# Patient Record
Sex: MALE | Race: BLACK OR AFRICAN AMERICAN | NOT HISPANIC OR LATINO | Employment: FULL TIME | URBAN - METROPOLITAN AREA
[De-identification: names, ages, dates, MRNs, and addresses within clinical notes are randomized per-mention and may not be internally consistent; named-entity substitution may affect disease eponyms.]

---

## 2024-04-02 ENCOUNTER — OFFICE VISIT (OUTPATIENT)
Dept: FAMILY MEDICINE | Facility: CLINIC | Age: 39
End: 2024-04-02
Payer: COMMERCIAL

## 2024-04-02 VITALS
DIASTOLIC BLOOD PRESSURE: 86 MMHG | BODY MASS INDEX: 35.77 KG/M2 | HEIGHT: 67 IN | HEART RATE: 98 BPM | SYSTOLIC BLOOD PRESSURE: 126 MMHG | WEIGHT: 227.9 LBS | OXYGEN SATURATION: 98 % | TEMPERATURE: 97.9 F

## 2024-04-02 DIAGNOSIS — E66.812 CLASS 2 OBESITY DUE TO EXCESS CALORIES WITHOUT SERIOUS COMORBIDITY WITH BODY MASS INDEX (BMI) OF 35.0 TO 35.9 IN ADULT: ICD-10-CM

## 2024-04-02 DIAGNOSIS — Z11.4 SCREENING FOR HIV (HUMAN IMMUNODEFICIENCY VIRUS): ICD-10-CM

## 2024-04-02 DIAGNOSIS — Z13.1 SCREENING FOR DIABETES MELLITUS: ICD-10-CM

## 2024-04-02 DIAGNOSIS — Z82.3 FAMILY HISTORY OF STROKE (CEREBROVASCULAR): ICD-10-CM

## 2024-04-02 DIAGNOSIS — Z11.59 NEED FOR HEPATITIS C SCREENING TEST: ICD-10-CM

## 2024-04-02 DIAGNOSIS — Z13.220 LIPID SCREENING: ICD-10-CM

## 2024-04-02 DIAGNOSIS — Z00.00 ROUTINE GENERAL MEDICAL EXAMINATION AT A HEALTH CARE FACILITY: Primary | ICD-10-CM

## 2024-04-02 DIAGNOSIS — E66.09 CLASS 2 OBESITY DUE TO EXCESS CALORIES WITHOUT SERIOUS COMORBIDITY WITH BODY MASS INDEX (BMI) OF 35.0 TO 35.9 IN ADULT: ICD-10-CM

## 2024-04-02 PROCEDURE — 99385 PREV VISIT NEW AGE 18-39: CPT | Performed by: FAMILY MEDICINE

## 2024-04-02 SDOH — HEALTH STABILITY: PHYSICAL HEALTH: ON AVERAGE, HOW MANY DAYS PER WEEK DO YOU ENGAGE IN MODERATE TO STRENUOUS EXERCISE (LIKE A BRISK WALK)?: 1 DAY

## 2024-04-02 SDOH — HEALTH STABILITY: PHYSICAL HEALTH: ON AVERAGE, HOW MANY MINUTES DO YOU ENGAGE IN EXERCISE AT THIS LEVEL?: 30 MIN

## 2024-04-02 ASSESSMENT — SOCIAL DETERMINANTS OF HEALTH (SDOH): HOW OFTEN DO YOU GET TOGETHER WITH FRIENDS OR RELATIVES?: ONCE A WEEK

## 2024-04-02 NOTE — PROGRESS NOTES
"Preventive Care Visit  [facility] ANAIS Guerra MD, Family Medicine  Apr 2, 2024      Assessment & Plan     (Z00.00) Routine general medical examination at a health care facility  (primary encounter diagnosis)  Comment: We discussed exercise 30mins/day, and calcium with vitamin D at 1200mg/day, preferably from dietary sources.  Diet, Weight loss, and Exercise were discussed as well. Reviewed risks for heart attack and stroke at his request and talked about which were in his control and how to begin to change habits.      Talked at length about vaccinations. He says he has some documentation at home. Will send that in. He declined to get any today     Plan: Vitamin D Deficiency            (E66.09,  Z68.35) Class 2 obesity due to excess calories without serious comorbidity with body mass index (BMI) of 35.0 to 35.9 in adult  Comment: He is feeling motivated to get more active and eat better   Plan:     (Z11.4) Screening for HIV (human immunodeficiency virus)  Comment: discussed   Plan: HIV Antigen Antibody Combo            (Z11.59) Need for hepatitis C screening test  Comment: discussed   Plan: Hepatitis C Screen Reflex to HCV RNA Quant and         Genotype            (Z82.3) Family history of stroke (cerebrovascular)  Comment: his father   Plan:     (Z13.220) Lipid screening  Comment: discussed   Plan: Lipid panel reflex to direct LDL Fasting            (Z13.1) Screening for diabetes mellitus  Comment: discussed   Plan: Glucose              BMI  Estimated body mass index is 35.56 kg/m  as calculated from the following:    Height as of this encounter: 1.705 m (5' 7.13\").    Weight as of this encounter: 103.4 kg (227 lb 14.4 oz).       Counseling  Appropriate preventive services were discussed with this patient, including applicable screening as appropriate for fall prevention, nutrition, physical activity, Tobacco-use cessation, weight loss and cognition.  Checklist reviewing preventive " services available has been given to the patient.  Reviewed patient's diet, addressing concerns and/or questions.   He is at risk for lack of exercise and has been provided with information to increase physical activity for the benefit of his well-being.   The patient was instructed to see the dentist every 6 months.           Nithin Núñez is a 38 year old, presenting for the following:  Physical        4/2/2024     1:34 PM   Additional Questions   Roomed by MIKEY Bailon   Accompanied by self        Health Care Directive  Patient does not have a Health Care Directive or Living Will: Discussed advance care planning with patient; however, patient declined at this time.    HPI    Has not been seen for care in the last 2 years, previous clinic [facility].   New patient  Says he wants to take better control of his health    Father with hypertension and had a stroke a few years ago   Has a two year old at home, supportive partner    - works from home        Thinks his tdap is up to date.  Came to use in 2012.  ompleted school in 2016 be thinks he received a tdap through his school     No additional concerns outside of physical   Would like to have cholesterol checked, he is not fasting today             4/2/2024   General Health   How would you rate your overall physical health? Excellent   Feel stress (tense, anxious, or unable to sleep) Not at all         4/2/2024   Nutrition   Three or more servings of calcium each day? Yes   Diet: Low salt   How many servings of fruit and vegetables per day? (!) 0-1   How many sweetened beverages each day? 0-1         4/2/2024   Exercise   Days per week of moderate/strenous exercise 1 day   Average minutes spent exercising at this level 30 min   (!) EXERCISE CONCERN  He likes to exercise       4/2/2024   Social Factors   Frequency of gathering with friends or relatives Once a week   Worry food won't last until get money to buy more No   Food not  "last or not have enough money for food? No   Do you have housing?  Yes   Are you worried about losing your housing? No   Lack of transportation? No   Unable to get utilities (heat,electricity)? No         4/2/2024   Dental   Dentist two times every year? (!) NO - hasn't been in awhile          4/2/2024   TB Screening   Were you born outside of the [place]? Yes         Today's PHQ-2 Score:       4/2/2024     1:25 PM   PHQ-2 ( 1999 [employer])   Q1: Little interest or pleasure in doing things 0   Q2: Feeling down, depressed or hopeless 0   PHQ-2 Score 0   Q1: Little interest or pleasure in doing things Not at all   Q2: Feeling down, depressed or hopeless Not at all   PHQ-2 Score 0           4/2/2024   Substance Use   Alcohol more than 3/day or more than 7/wk No   Do you use any other substances recreationally? No                4/2/2024   One time HIV Screening   Previous HIV test? No         4/2/2024   STI Screening   New sexual partner(s) since last STI/HIV test? No         4/2/2024   Contraception/Family Planning   Questions about contraception or family planning No        Reviewed and updated as needed this visit by Provider                        Review of Systems  Constitutional, neuro, ENT, endocrine, pulmonary, cardiac, gastrointestinal, genitourinary, musculoskeletal, integument and psychiatric systems are negative, except as otherwise noted.     Objective    Exam  /86   Pulse 98   Temp 97.9  F (36.6  C) (Tympanic)   Ht 1.705 m (5' 7.13\")   Wt 103.4 kg (227 lb 14.4 oz)   SpO2 98%   BMI 35.56 kg/m     Estimated body mass index is 35.56 kg/m  as calculated from the following:    Height as of this encounter: 1.705 m (5' 7.13\").    Weight as of this encounter: 103.4 kg (227 lb 14.4 oz).    Physical Exam  GENERAL: alert and no distress  EYES: Eyes grossly normal to inspection, PERRL and conjunctivae and sclerae normal  HENT: ear canals and TM's normal, nose and mouth without ulcers or lesions  NECK: no " adenopathy, no asymmetry, masses, or scars  RESP: lungs clear to auscultation - no rales, rhonchi or wheezes  CV: regular rate and rhythm, normal S1 S2, no S3 or S4, no murmur, click or rub, no peripheral edema  ABDOMEN: soft, nontender, no hepatosplenomegaly, no masses and bowel sounds normal  MS: no gross musculoskeletal defects noted, no edema  SKIN: no suspicious lesions or rashes  NEURO: Normal strength and tone, mentation intact and speech normal  PSYCH: mentation appears normal, affect normal/bright        Signed Electronically by: Marian Guerra MD